# Patient Record
Sex: MALE | Race: WHITE
[De-identification: names, ages, dates, MRNs, and addresses within clinical notes are randomized per-mention and may not be internally consistent; named-entity substitution may affect disease eponyms.]

---

## 2017-10-20 ENCOUNTER — HOSPITAL ENCOUNTER (OUTPATIENT)
Dept: HOSPITAL 33 - SDC | Age: 52
Discharge: HOME | End: 2017-10-20
Attending: FAMILY MEDICINE
Payer: COMMERCIAL

## 2017-10-20 VITALS — SYSTOLIC BLOOD PRESSURE: 140 MMHG | DIASTOLIC BLOOD PRESSURE: 98 MMHG | HEART RATE: 78 BPM

## 2017-10-20 VITALS — OXYGEN SATURATION: 98 %

## 2017-10-20 DIAGNOSIS — E11.9: ICD-10-CM

## 2017-10-20 DIAGNOSIS — I10: ICD-10-CM

## 2017-10-20 DIAGNOSIS — Z12.11: Primary | ICD-10-CM

## 2017-10-20 PROCEDURE — 0DJD8ZZ INSPECTION OF LOWER INTESTINAL TRACT, VIA NATURAL OR ARTIFICIAL OPENING ENDOSCOPIC: ICD-10-PCS | Performed by: FAMILY MEDICINE

## 2017-10-20 PROCEDURE — 00810: CPT

## 2017-10-20 PROCEDURE — 82962 GLUCOSE BLOOD TEST: CPT

## 2017-10-20 NOTE — OP
SURGERY DATE/TIME:     10/20/2017  0823



PREOPERATIVE DIAGNOSIS:    Screening exam. 



POSTOPERATIVE DIAGNOSIS:    Normal colon. 



PROCEDURE:     Colonoscopy.



SURGEON:    Dr. Solis.



ANESTHESIA:    Medications were given by the anesthesia department. 

 

HISTORY:  The patient is a52 year old white male patient now presenting for screening 
colonoscopy.  The patient was appraised of the risks of the procedure including the risk 
of perforation, phlebitis, untoward reaction to medication, bleeding or missed lesions. 
The patient verbalized his understanding and desired to have the procedure performed.



DESCRIPTION OF PROCEDURE:    The patient was given the medications by the anesthesia 
department. He had continuous pulse oximetry, ECG monitoring, intermittent blood pressure 
monitoring and tidal CO2 monitoring during the examination. He was placed in left lateral 
decubitus position. A digital rectal examination was performed and revealed normal anal 
sphincter tone, no masses and normal prostate. The flexible Olympus pediatric colonoscope 
was used to intubate the rectum.  A view of the colon was developed sequentially to the 
cecum. Upon insertion and withdrawal, including a retroflex view in the rectum, no mucosal 
lesions were encountered.  The scope was removed from the patient who tolerated the 
procedure well and was sent back to OP recovery in good condition. The prep was noted to 
be fair to at times poor but we were able to adequately evaluate the colon for polyps.

## 2023-03-24 ENCOUNTER — HOSPITAL ENCOUNTER (OUTPATIENT)
Dept: HOSPITAL 33 - SDC | Age: 58
Discharge: HOME | End: 2023-03-24
Attending: FAMILY MEDICINE
Payer: COMMERCIAL

## 2023-03-24 VITALS — OXYGEN SATURATION: 95 % | SYSTOLIC BLOOD PRESSURE: 138 MMHG | HEART RATE: 65 BPM | DIASTOLIC BLOOD PRESSURE: 85 MMHG

## 2023-03-24 DIAGNOSIS — Z09: Primary | ICD-10-CM

## 2023-03-24 DIAGNOSIS — Z86.010: ICD-10-CM

## 2023-03-24 DIAGNOSIS — E11.9: ICD-10-CM

## 2023-03-24 PROCEDURE — 82947 ASSAY GLUCOSE BLOOD QUANT: CPT

## 2023-03-24 NOTE — OP
SURGERY DATE:    03/24/2023



SURGERY TIME:      0755



PREOPERATIVE DIAGNOSIS:    

1.  SCREENING EXAM.

2.  HISTORY OF COLON POLYPS.



POSTOPERATIVE DIAGNOSIS:    

1.  NORMAL EXAM.



PROCEDURE:    

1.  Colonoscopy.



SURGEON:    Dr. Solis.



ANESTHESIA:    MAC. Medications given by the Anesthesia Department.



BRIEF HISTORY:    The patient is a 58 y/o WM patient presenting for colonoscopic 
evaluation. The patient reports previous history of 3 previous colonoscopies, 2 of which 
had polyps removed. The patient was felt to need to have endoscopic evaluation. He was 
described the risks of the procedure including the risk of perforation, phlebitis, 
untoward reaction to medication, bleeding, and missed lesions.  The patient verbalized his 
understanding and desired to have the procedure performed.



DESCRIPTION OF PROCEDURE:    The patient was given the medications by the Anesthesia 
Department. He had continuous pulse oximetry, ECG monitoring, and intermittent BP 
monitoring during the examination. He was placed in the left lateral decubitus position. A 
digital rectal examination was performed and revealed normal anal sphincter tone and no 
masses.  The flexible Olympus pediatric colonoscope was used to intubate the rectum.  A 
view of the colon was developed sequentially to the cecum. Upon insertion and withdrawal, 
including a retroflex view in the rectum, no mucosal lesions were encountered. The scope 
was removed from the patient who tolerated the procedure well and was sent back to OP 
recovery in good condition. The prep was noted to be fair.

## 2025-02-06 ENCOUNTER — HOSPITAL ENCOUNTER (EMERGENCY)
Dept: HOSPITAL 33 - ED | Age: 60
Discharge: HOME | End: 2025-02-06
Payer: COMMERCIAL

## 2025-02-06 VITALS — HEART RATE: 98 BPM | SYSTOLIC BLOOD PRESSURE: 138 MMHG | RESPIRATION RATE: 16 BRPM | DIASTOLIC BLOOD PRESSURE: 99 MMHG

## 2025-02-06 VITALS — OXYGEN SATURATION: 96 %

## 2025-02-06 VITALS — TEMPERATURE: 97.4 F

## 2025-02-06 DIAGNOSIS — J10.1: Primary | ICD-10-CM

## 2025-02-06 DIAGNOSIS — M79.10: ICD-10-CM

## 2025-02-06 DIAGNOSIS — Z79.899: ICD-10-CM

## 2025-02-06 DIAGNOSIS — E78.5: ICD-10-CM

## 2025-02-06 DIAGNOSIS — I10: ICD-10-CM

## 2025-02-06 DIAGNOSIS — R05.1: ICD-10-CM

## 2025-02-06 DIAGNOSIS — E11.9: ICD-10-CM

## 2025-02-06 DIAGNOSIS — R11.2: ICD-10-CM

## 2025-02-06 LAB
ALBUMIN SERPL-MCNC: 4.6 G/DL (ref 3.5–5)
ALP SERPL-CCNC: 65 U/L (ref 38–126)
ALT SERPL-CCNC: 61 U/L (ref 0–50)
ANION GAP SERPL CALC-SCNC: 16.8 MEQ/L (ref 5–15)
AST SERPL QL: 74 U/L (ref 17–59)
BASOPHILS # BLD AUTO: 0.05 X10^3/UL (ref 0.01–0.08)
BASOPHILS NFR BLD AUTO: 0.6 % (ref 0.2–1.2)
BILIRUB BLD-MCNC: 2.1 MG/DL (ref 0.2–1.3)
BUN SERPL-MCNC: 14 MG/DL (ref 9–20)
CALCIUM SPEC-MCNC: 8.9 MG/DL (ref 8.4–10.2)
CHLORIDE SERPL-SCNC: 94 MMOL/L (ref 98–107)
CO2 SERPL-SCNC: 26 MMOL/L (ref 22–30)
CREAT SERPL-MCNC: 1.09 MG/DL (ref 0.66–1.25)
EOSINOPHIL # BLD AUTO: 0.01 X10^3/UL (ref 0.04–0.54)
FLUAV AG NPH QL IA: POSITIVE
FLUBV AG NPH QL IA: NEGATIVE
GFR SERPLBLD BASED ON 1.73 SQ M-ARVRAT: 78.7 ML/MIN
GLUCOSE SERPL-MCNC: 248 MG/DL (ref 74–106)
HCT VFR BLD AUTO: 51.2 % (ref 40.1–51)
HGB BLD-MCNC: 17.6 G/DL (ref 13.7–17.5)
IMM GRANULOCYTES # BLD: 0.19 X10^3U/L (ref 0–0.03)
IMM GRANULOCYTES NFR BLD: 2.2 % (ref 0–0.43)
LYMPHOCYTES # SPEC AUTO: 0.59 X10^3/UL (ref 1.32–3.57)
MAGNESIUM SERPL-MCNC: 1.9 MG/DL (ref 1.6–2.3)
MCH RBC QN AUTO: 30.8 PG (ref 25.7–32.2)
MCHC RBC AUTO-ENTMCNC: 34.4 G/DL (ref 32.3–36.5)
MONOCYTES # BLD AUTO: 0.87 X10^3/UL (ref 0.3–0.82)
NRBC # BLD AUTO: 0 X10^3U/L (ref 0–0.01)
NRBC BLD AUTO-RTO: 0 % (ref 0–0.2)
PLATELET # BLD AUTO: 144 X10^3/UL (ref 163–337)
POTASSIUM SERPLBLD-SCNC: 4.6 MMOL/L (ref 3.5–5.1)
PROT SERPL-MCNC: 7.7 G/DL (ref 6.3–8.2)
RBC # BLD AUTO: 5.72 X10^6/UL (ref 4.63–6.08)
RSV AG SPEC QL IA: NEGATIVE
SARS-COV-2 AG RESP QL IA.RAPID: NEGATIVE
SODIUM SERPL-SCNC: 132 MMOL/L (ref 135–145)
WBC # BLD AUTO: 8.5 X10^3/UL (ref 4.23–9.07)

## 2025-02-06 PROCEDURE — 80053 COMPREHEN METABOLIC PANEL: CPT

## 2025-02-06 PROCEDURE — 85025 COMPLETE CBC W/AUTO DIFF WBC: CPT

## 2025-02-06 PROCEDURE — 83735 ASSAY OF MAGNESIUM: CPT

## 2025-02-06 PROCEDURE — 0241U: CPT

## 2025-02-06 PROCEDURE — 96375 TX/PRO/DX INJ NEW DRUG ADDON: CPT

## 2025-02-06 PROCEDURE — 83605 ASSAY OF LACTIC ACID: CPT

## 2025-02-06 PROCEDURE — 96374 THER/PROPH/DIAG INJ IV PUSH: CPT

## 2025-02-06 PROCEDURE — 36415 COLL VENOUS BLD VENIPUNCTURE: CPT

## 2025-02-06 PROCEDURE — 93005 ELECTROCARDIOGRAM TRACING: CPT

## 2025-02-06 PROCEDURE — 99284 EMERGENCY DEPT VISIT MOD MDM: CPT

## 2025-02-06 PROCEDURE — 71045 X-RAY EXAM CHEST 1 VIEW: CPT

## 2025-02-06 PROCEDURE — 94640 AIRWAY INHALATION TREATMENT: CPT

## 2025-02-06 PROCEDURE — 84484 ASSAY OF TROPONIN QUANT: CPT

## 2025-02-06 RX ADMIN — OSELTAMIVIR PHOSPHATE ONE MG: 75 CAPSULE ORAL at 18:58

## 2025-02-06 RX ADMIN — IPRATROPIUM BROMIDE AND ALBUTEROL SULFATE ONE ML: .5; 3 SOLUTION RESPIRATORY (INHALATION) at 17:47

## 2025-02-06 RX ADMIN — KETOROLAC TROMETHAMINE ONE MG: 30 INJECTION, SOLUTION INTRAMUSCULAR; INTRAVENOUS at 17:34

## 2025-02-06 RX ADMIN — WATER ONE MG: 1 INJECTION INTRAMUSCULAR; INTRAVENOUS; SUBCUTANEOUS at 18:35

## 2025-02-06 NOTE — ERPHSYRPT
- History of Present Illness


Time Seen by Provider: 02/06/25 16:41


Source: patient, family


Exam Limitations: no limitations


Patient Subjective Stated Complaint: PT HERE FOR SOB FOR A COUPLE DAYS NOW, 

COUGH, ACHES.


Triage Nursing Assessment: PT ALERT, WALKED IN, RESP EASY, OCC COUGH, CHEST 

CLEAR, ABD SOFT, NO EDEMA NOTED,


Physician History: 





59 years old male presented in the ER with complains of aches and pains all ove

r, sinus/nasal congestion, cough productive of yellow-green sputum for the last 

2 to 3 days with progressive worsening.  Patient is having coughing spells with 

feeling shortness of breath.  Because of repeated coughing having generalized 

chest soreness and low back pain.  Has nausea and dry heaving with no vomiting. 

Wife had similar symptoms.  Does have loose stool as well.  Subjective feeling 

of fever and chills.


Allergies/Adverse Reactions: 








No Known Drug Allergies Allergy (Verified 02/06/25 16:41)


   





Hx Tetanus, Diphtheria Vaccination/Date Given: Yes (UNKNOWN)


Hx Influenza Vaccination/Date Given: No


Hx Pneumococcal Vaccination/Date Given: No


Immunizations Up to Date: Yes





Travel Risk





- International Travel


Have you traveled outside of the country in past 3 weeks: No





- Emerging Infectious Disease


Are you exhibiting symptoms associated with any current EIDs: Yes


Symptoms: Cough: New Onset, Shortness of Breath





- Review of Systems


Constitutional: Fever, Chills, Fatigue, Weakness


Eyes: No Symptoms


Ears, Nose, & Throat: Nose Congestion, Throat Swelling


Respiratory: Cough


Cardiac: No Symptoms


Abdominal/Gastrointestinal: Nausea, Diarrhea


Genitourinary Symptoms: No Symptoms


Musculoskeletal: Back Pain, Myalgias


Neurological: Headache


Endocrine: No Symptoms


Hematologic/Lymphatic: No Symptoms


Immunological/Allergic: No Symptoms





- Past Medical History


Pertinent Past Medical History: Yes


Neurological History: TIA


ENT History: No Pertinent History


Cardiac History: Aneurysm, High Cholesterol, Hypertension


Respiratory History: No Pertinent History


Endocrine Medical History: Diabetes Type II


Musculoskeletal History: No Pertinent History


GI Medical History: No Pertinent History


 History: No Pertinent History


Psycho-Social History: No Pertinent History


Male Reproductive Disorders: No Pertinent History


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





- Past Surgical History


Past Surgical History: No


Neuro Surgical History: No Pertinent History


Cardiac: No Pertinent History


Respiratory: No Pertinent History


Gastrointestinal: No Pertinent History


Genitourinary: No Pertinent History


Musculoskeletal: No Pertinent History


Male Surgical History: No Pertinent History





- Social History


Smoking Status: Never smoker


Exposure to second hand smoke: No


Drug Use: none


Patient Lives Alone: No





- Social Determinants of Health


Will the patient participate in the screening: Declined to provide





- Nursing Vital Signs


Nursing Vital Signs: 


                               Initial Vital Signs











Temperature  97.4 F   02/06/25 16:49


 


Pulse Rate  87   02/06/25 16:49


 


Respiratory Rate  18   02/06/25 16:49


 


Blood Pressure  190/128   02/06/25 16:49


 


O2 Sat by Pulse Oximetry  96   02/06/25 16:49








                                   Pain Scale











Pain Intensity                 4

















- Physical Exam


General Appearance: no apparent distress, alert


Eye Exam: PERRL/EOMI


Ears, Nose, Throat Exam: moist mucous membranes, pharyngeal erythema


Neck Exam: normal inspection, non-tender, supple, full range of motion


Respiratory Exam: normal breath sounds, lungs clear


Cardiovascular Exam: regular rate/rhythm, normal heart sounds


Gastrointestinal/Abdomen Exam: soft, normal bowel sounds, No tenderness, No 

rebound


Back Exam: normal inspection, normal range of motion


Extremity Exam: normal inspection, normal range of motion


Neurologic Exam: alert, oriented x 3, cooperative, CNs II-XII nml as tested


Skin Exam: normal color


**SpO2 Interpretation**: normal


SpO2: 96


O2 Delivery: Room Air





- Course


EKG Interpreted by Me: RATE ([id number]), Sinus Rhythm, Left Axis Deviation, NORMAL 

INTERVALS, NORMAL QRS


Ordered Tests: 


                               Active Orders 24 hr











 Category Date Time Status


 


 CHEST 1 VIEW (PORTABLE) Stat Exams  02/06/25 16:57 Taken


 


 CBC W DIFF Stat Lab  02/06/25 17:39 Completed


 


 CMP Stat Lab  02/06/25 18:00 Completed


 


 Lactic Acid Stat Lab  02/06/25 17:45 Completed


 


 MAGNESIUM Stat Lab  02/06/25 18:00 Completed


 


 TROPONIN Q4H Lab  02/06/25 18:00 Completed


 


 Respiratory Therapy Assessment DAILY RT  02/06/25 17:50 Completed








Medication Summary














Discontinued Medications














Generic Name Dose Route Start Last Admin





  Trade Name Freq  PRN Reason Stop Dose Admin


 


Albuterol/Ipratropium  3 ml  02/06/25 17:28  02/06/25 17:47





  Ipratropium/Albuterol Sulfate 3 Ml Ampul.Neb  IH  02/06/25 17:29  3 ml





  STAT ONE   Administration


 


Albuterol/Ipratropium  Confirm  02/06/25 17:44 





  Ipratropium/Albuterol Sulfate 3 Ml Ampul.Neb  Administered  02/06/25 17:45 





  Dose  





  3 ml  





  IH  





  .STK-MED ONE  


 


Methylprednisolone Sodium  0 mg  02/06/25 18:24  02/06/25 18:35





Succinate 125 mg/ Sterile  IV  02/06/25 18:25  125 mg





Water 2 ml  STAT ONE   Administration


 


Sodium Chloride  1,000 mls @ 999 mls/hr  02/06/25 18:23  02/06/25 18:35





  Sodium Chloride 0.9% 1000 Ml  IV  02/06/25 19:23  999 mls/hr





  .Q1H1M STA   Administration


 


Sodium Chloride  Confirm  02/06/25 18:31 





  Sodium Chloride 0.9% 1000 Ml  Administered  02/06/25 18:32 





  Dose  





  1,000 mls @ ud  





  .ROUTE  





  .STK-MED ONE  


 


Ketorolac Tromethamine  30 mg  02/06/25 17:28  02/06/25 17:34





  Ketorolac Tromethamine 30 Mg/Ml Inj  IV  02/06/25 17:29  30 mg





  STAT ONE   Administration


 


Ketorolac Tromethamine  Confirm  02/06/25 17:31 





  Ketorolac Tromethamine 30 Mg/Ml Inj  Administered  02/06/25 17:32 





  Dose  





  30 mg  





  .ROUTE  





  .STK-MED ONE  


 


Methylprednisolone Sodium Succinate  Confirm  02/06/25 18:31 





  Methylprednis Sod Succ 125 Mg/2 Ml Vial***  Administered  02/06/25 18:32 





  Dose  





  125 mg  





  .ROUTE  





  .STK-MED ONE  


 


Oseltamivir Phosphate  75 mg  02/06/25 18:53  02/06/25 18:58





  Oseltamivir 75 Mg Cap  PO  02/06/25 18:54  75 mg





  STAT ONE   Administration


 


Oseltamivir Phosphate  Confirm  02/06/25 18:56 





  Oseltamivir 75 Mg Cap  Administered  02/06/25 18:57 





  Dose  





  75 mg  





  PO  





  .STK-MED ONE  


 


Sterile Water  Confirm  02/06/25 18:31 





  Water For Injection,Sterile 10 Ml Vial  Administered  02/06/25 18:32 





  Dose  





  10 ml  





  IJ  





  .STK-MED ONE  











Lab/Rad Data: 


                           Laboratory Result Diagrams





                                 02/06/25 17:39 





                                 02/06/25 18:00 





                               Laboratory Results











  02/06/25 02/06/25 02/06/25 Range/Units





  18:00 18:00 17:56 


 


WBC     (4.23-9.07)  x10^3/uL


 


RBC     (4.63-6.08)  x10^6/uL


 


Hgb     (13.7-17.5)  g/dL


 


Hct     (40.1-51.0)  %


 


MCV     (79.0-92.2)  fL


 


MCH     (25.7-32.2)  pg


 


MCHC     (32.3-36.5)  g/dL


 


RDW     (11.6-14.4)  %


 


Plt Count     (163-337)  x10^3/uL


 


MPV     (9.4-12.4)  fL


 


Gran %     (34.0-67.9)  %


 


Immature Gran % (Auto)     (0.001-0.429)  %


 


Nucleat RBC Rel Count     (0.00-0.2)  %


 


Eos # (Auto)     (0.04-0.54)  x10^3/uL


 


Immature Gran # (Auto)     (0.001-0.031)  x10^3u/L


 


Absolute Lymphs (auto)     (1.32-3.57)  x10^3/uL


 


Absolute Monos (auto)     (0.30-0.82)  x10^3/uL


 


Absolute Nucleated RBC     (0.00-0.012)  x10^3u/L


 


Lymphocytes %     (21.8-53.1)  %


 


Monocytes %     (5.3-12.2)  %


 


Eosinophils %     (0.8-7.0)  %


 


Basophils %     (0.2-1.2)  %


 


Absolute Granulocytes     (1.78-5.38)  x10^3/uL


 


Basophils #     (0.01-0.08)  x10^3/uL


 


Sodium   132 L   (135-145)  mmol/L


 


Potassium   4.6   (3.5-5.1)  mmol/L


 


Chloride   94 L   ()  mmol/L


 


Carbon Dioxide   26   (22-30)  mmol/L


 


Anion Gap   16.8 H   (5-15)  MEQ/L


 


BUN   14   (9-20)  mg/dL


 


Creatinine   1.09   (0.66-1.25)  mg/dL


 


Estimated GFR   78.7   ML/MIN


 


Glucose   248 H   ()  mg/dL


 


Lactic Acid     (0.4-2.0)  


 


Calcium   8.9   (8.4-10.2)  mg/dL


 


Magnesium   1.9   (1.6-2.3)  mg/dL


 


Total Bilirubin   2.10 H   (0.2-1.3)  mg/dL


 


AST   74 H   (17-59)  U/L


 


ALT   61 H   (0-50)  U/L


 


Alkaline Phosphatase   65   ()  U/L


 


Troponin I  < 0.012    (0.000-0.033)  ng/mL


 


Serum Total Protein   7.7   (6.3-8.2)  g/dL


 


Albumin   4.6   (3.5-5.0)  g/dL


 


Influenza Type A Ag    POSITIVE A  (NEGATIVE)  


 


Influenza Type B Ag    NEGATIVE  (NEGATIVE)  


 


RSV (PCR)    NEGATIVE  (NEGATIVE)  


 


SARS-CoV-2 (PCR)    NEGATIVE  (NEGATIVE)  














  02/06/25 02/06/25 Range/Units





  17:45 17:39 


 


WBC   8.5  (4.23-9.07)  x10^3/uL


 


RBC   5.72  (4.63-6.08)  x10^6/uL


 


Hgb   17.6 H  (13.7-17.5)  g/dL


 


Hct   51.2 H  (40.1-51.0)  %


 


MCV   89.5  (79.0-92.2)  fL


 


MCH   30.8  (25.7-32.2)  pg


 


MCHC   34.4  (32.3-36.5)  g/dL


 


RDW   12.9  (11.6-14.4)  %


 


Plt Count   144 L  (163-337)  x10^3/uL


 


MPV   10.6  (9.4-12.4)  fL


 


Gran %   79.8 H  (34.0-67.9)  %


 


Immature Gran % (Auto)   2.2 H  (0.001-0.429)  %


 


Nucleat RBC Rel Count   0.0  (0.00-0.2)  %


 


Eos # (Auto)   0.01 L  (0.04-0.54)  x10^3/uL


 


Immature Gran # (Auto)   0.19 H  (0.001-0.031)  x10^3u/L


 


Absolute Lymphs (auto)   0.59 L  (1.32-3.57)  x10^3/uL


 


Absolute Monos (auto)   0.87 H  (0.30-0.82)  x10^3/uL


 


Absolute Nucleated RBC   0.00  (0.00-0.012)  x10^3u/L


 


Lymphocytes %   7.0 L  (21.8-53.1)  %


 


Monocytes %   10.3  (5.3-12.2)  %


 


Eosinophils %   0.1 L  (0.8-7.0)  %


 


Basophils %   0.6  (0.2-1.2)  %


 


Absolute Granulocytes   6.75 H  (1.78-5.38)  x10^3/uL


 


Basophils #   0.05  (0.01-0.08)  x10^3/uL


 


Sodium    (135-145)  mmol/L


 


Potassium    (3.5-5.1)  mmol/L


 


Chloride    ()  mmol/L


 


Carbon Dioxide    (22-30)  mmol/L


 


Anion Gap    (5-15)  MEQ/L


 


BUN    (9-20)  mg/dL


 


Creatinine    (0.66-1.25)  mg/dL


 


Estimated GFR    ML/MIN


 


Glucose    ()  mg/dL


 


Lactic Acid  2.2 H   (0.4-2.0)  


 


Calcium    (8.4-10.2)  mg/dL


 


Magnesium    (1.6-2.3)  mg/dL


 


Total Bilirubin    (0.2-1.3)  mg/dL


 


AST    (17-59)  U/L


 


ALT    (0-50)  U/L


 


Alkaline Phosphatase    ()  U/L


 


Troponin I    (0.000-0.033)  ng/mL


 


Serum Total Protein    (6.3-8.2)  g/dL


 


Albumin    (3.5-5.0)  g/dL


 


Influenza Type A Ag    (NEGATIVE)  


 


Influenza Type B Ag    (NEGATIVE)  


 


RSV (PCR)    (NEGATIVE)  


 


SARS-CoV-2 (PCR)    (NEGATIVE)  














- Progress


Progress: improved, re-examined


Air Movement: good


Progress Note: 





02/06/25 19:35


59 years old is evaluated in the ER for flulike symptoms.  


EKG is normal sinus rhythm with no ST elevation.


He is given Toradol for symptomatic relief.  Chest x-ray no obvious infiltrate 

reviewed by me, questionable opacity in the left lower lobe, official report is 

pending.


Normal white count, chemistries with a glucose of 246, elevated transaminases 

and total bili of 2.  1 and a lactate of 2.2 and a gap of 16.


He is given fluids and feeling much better on reevaluation.


Patient is positive influenza A and started on Tamiflu along with a dose of 

Solu-Medrol


Has negative troponins.


I believe patient's symptoms are viral etiology from influenza A, do not think 

it is cardiac etiology and do not think it needs any other workup and is stable 

for discharge.  Discussed signs symptoms of worsening needing return to ER which

he seems understanding..








Blood Culture(s) Obtained: No


Antibiotics given: No


Counseled pt/family regarding: lab results, diagnosis, need for follow-up, rad 

results





Medical Desision Making





- Independent Historian


Additional History obtained from: Spouse





- Diagnostic Testing


Diagnostic test were ordered, analyzed, and reviewed by me: Yes


Radiological Interpretation: Interpreted by me, Reviewed by me





- Risk of complications


The pt has a mod risk of morbidity or mortality based on: Need for prescription 

drug management





- Departure


Departure Disposition: Home


Clinical Impression: 


 Influenza A, Viral syndrome





Condition: Stable


Critical Care Time: No


Referrals: 


NEGAR HANLEY [Primary Care Provider] - Follow up with PCP 1 day


Instructions:  Flu in adults - ED discharge instructions


Additional Instructions: 


Drink plenty of fluids.  Take Tylenol/ibuprofen as needed.  Follow-up with 

primary care for reevaluation.  Return to ER for any worsening.


Prescriptions: 


Albuterol Sulfate [Albuterol Sulfate Hfa] 8.5 gm IH Q6H PRN 7 Days #1 inh


 PRN Reason: Cough


Oseltamivir 75 mg*** [Tamiflu 75MG Capsule***] 75 mg PO BID #10 cap

## 2025-02-07 NOTE — XRAY
Indication: Short of breath.



Comparison: October 9, 2015



Portable apical lordotic chest again demonstrates minimal left base

subsegmental atelectasis/scarring.  Remaining heart and lungs unremarkable.

Bony thorax intact again with mild degenerative changes.  No acute findings.

## 2025-05-06 ENCOUNTER — HOSPITAL ENCOUNTER (OUTPATIENT)
Dept: HOSPITAL 33 - SDC | Age: 60
Discharge: HOME | End: 2025-05-06
Attending: STUDENT IN AN ORGANIZED HEALTH CARE EDUCATION/TRAINING PROGRAM
Payer: COMMERCIAL

## 2025-05-06 VITALS — RESPIRATION RATE: 18 BRPM

## 2025-05-06 VITALS — OXYGEN SATURATION: 97 % | HEART RATE: 73 BPM | TEMPERATURE: 97.4 F

## 2025-05-06 VITALS — DIASTOLIC BLOOD PRESSURE: 77 MMHG | SYSTOLIC BLOOD PRESSURE: 143 MMHG

## 2025-05-06 DIAGNOSIS — M86.9: Primary | ICD-10-CM

## 2025-05-06 DIAGNOSIS — E11.42: ICD-10-CM

## 2025-05-06 LAB
ALBUMIN SERPL-MCNC: 4.3 G/DL (ref 3.5–5)
ANION GAP SERPL CALC-SCNC: 16.3 MEQ/L (ref 5–15)
BASOPHILS # BLD AUTO: 0.08 X10^3/UL (ref 0.01–0.08)
BILIRUB BLD-MCNC: 1.3 MG/DL (ref 0.2–1.3)
CALCIUM SPEC-MCNC: 9.5 MG/DL (ref 8.4–10.2)
CREAT SERPL-MCNC: 0.93 MG/DL (ref 0.66–1.25)
EOSINOPHIL # BLD AUTO: 0.22 X10^3/UL (ref 0.04–0.54)
GFR SERPLBLD BASED ON 1.73 SQ M-ARVRAT: 95.2 ML/MIN
HCT VFR BLD AUTO: 43.8 % (ref 40.1–51)
HGB BLD-MCNC: 15.2 G/DL (ref 13.7–17.5)
IMM GRANULOCYTES # BLD: 0.11 X10^3U/L (ref 0–0.03)
IMM GRANULOCYTES NFR BLD: 1.4 % (ref 0–0.43)
LYMPHOCYTES # SPEC AUTO: 1.64 X10^3/UL (ref 1.32–3.57)
MCH RBC QN AUTO: 30.7 PG (ref 25.7–32.2)
MCHC RBC AUTO-ENTMCNC: 34.7 G/DL (ref 32.3–36.5)
MONOCYTES # BLD AUTO: 0.76 X10^3/UL (ref 0.3–0.82)
PLATELET # BLD AUTO: 228 X10^3/UL (ref 163–337)
POTASSIUM SERPLBLD-SCNC: 4.1 MMOL/L (ref 3.5–5.1)
PROT SERPL-MCNC: 7.7 G/DL (ref 6.3–8.2)
RBC # BLD AUTO: 4.95 X10^6/UL (ref 4.63–6.08)
WBC # BLD AUTO: 8.1 X10^3/UL (ref 4.23–9.07)

## 2025-05-06 PROCEDURE — 93005 ELECTROCARDIOGRAM TRACING: CPT

## 2025-05-06 PROCEDURE — 87075 CULTR BACTERIA EXCEPT BLOOD: CPT

## 2025-05-06 PROCEDURE — 28810 AMPUTATION TOE & METATARSAL: CPT

## 2025-05-06 PROCEDURE — 83036 HEMOGLOBIN GLYCOSYLATED A1C: CPT

## 2025-05-06 PROCEDURE — A6260 WOUND CLEANSER ANY TYPE/SIZE: HCPCS

## 2025-05-06 PROCEDURE — 85025 COMPLETE CBC W/AUTO DIFF WBC: CPT

## 2025-05-06 PROCEDURE — 36415 COLL VENOUS BLD VENIPUNCTURE: CPT

## 2025-05-06 PROCEDURE — 87070 CULTURE OTHR SPECIMN AEROBIC: CPT

## 2025-05-06 PROCEDURE — 80053 COMPREHEN METABOLIC PANEL: CPT

## 2025-05-06 RX ADMIN — CEFAZOLIN SCH MLS/HR: 2 INJECTION, POWDER, FOR SOLUTION INTRAMUSCULAR; INTRAVENOUS at 08:55

## 2025-05-13 ENCOUNTER — HOSPITAL ENCOUNTER (OUTPATIENT)
Dept: HOSPITAL 33 - SDC | Age: 60
Discharge: HOME | End: 2025-05-13
Attending: STUDENT IN AN ORGANIZED HEALTH CARE EDUCATION/TRAINING PROGRAM
Payer: COMMERCIAL

## 2025-05-13 VITALS — DIASTOLIC BLOOD PRESSURE: 97 MMHG | SYSTOLIC BLOOD PRESSURE: 163 MMHG | HEART RATE: 70 BPM | OXYGEN SATURATION: 97 %

## 2025-05-13 VITALS — RESPIRATION RATE: 16 BRPM

## 2025-05-13 VITALS — TEMPERATURE: 97.2 F

## 2025-05-13 DIAGNOSIS — E11.621: ICD-10-CM

## 2025-05-13 DIAGNOSIS — E11.42: ICD-10-CM

## 2025-05-13 DIAGNOSIS — M86.9: Primary | ICD-10-CM

## 2025-05-13 LAB
ANION GAP SERPL CALC-SCNC: 15.8 MEQ/L (ref 5–15)
BILIRUB BLD-MCNC: 1.3 MG/DL (ref 0.2–1.3)
CALCIUM SPEC-MCNC: 9.5 MG/DL (ref 8.4–10.2)
CREAT SERPL-MCNC: 0.95 MG/DL (ref 0.66–1.25)
GFR SERPLBLD BASED ON 1.73 SQ M-ARVRAT: 92.8 ML/MIN
HCT VFR BLD AUTO: 42.1 % (ref 40.1–51)
HGB BLD-MCNC: 14.4 G/DL (ref 13.7–17.5)
MCH RBC QN AUTO: 30.3 PG (ref 25.7–32.2)
MCHC RBC AUTO-ENTMCNC: 34.2 G/DL (ref 32.3–36.5)
PLATELET # BLD AUTO: 231 X10^3/UL (ref 163–337)
POTASSIUM SERPLBLD-SCNC: 4.4 MMOL/L (ref 3.5–5.1)
PROT SERPL-MCNC: 7.6 G/DL (ref 6.3–8.2)
RBC # BLD AUTO: 4.75 X10^6/UL (ref 4.63–6.08)
WBC # BLD AUTO: 7.3 X10^3/UL (ref 4.23–9.07)

## 2025-05-13 PROCEDURE — 87206 SMEAR FLUORESCENT/ACID STAI: CPT

## 2025-05-13 PROCEDURE — 80053 COMPREHEN METABOLIC PANEL: CPT

## 2025-05-13 PROCEDURE — 87046 STOOL CULTR AEROBIC BACT EA: CPT

## 2025-05-13 PROCEDURE — 28005 TREAT FOOT BONE LESION: CPT

## 2025-05-13 PROCEDURE — 13160 SEC CLSR SURG WND/DEHSN XTN: CPT

## 2025-05-13 PROCEDURE — 87070 CULTURE OTHR SPECIMN AEROBIC: CPT

## 2025-05-13 PROCEDURE — 85027 COMPLETE CBC AUTOMATED: CPT

## 2025-05-13 PROCEDURE — 87116 MYCOBACTERIA CULTURE: CPT

## 2025-05-13 PROCEDURE — 82947 ASSAY GLUCOSE BLOOD QUANT: CPT

## 2025-05-13 PROCEDURE — 87075 CULTR BACTERIA EXCEPT BLOOD: CPT

## 2025-05-13 PROCEDURE — 36415 COLL VENOUS BLD VENIPUNCTURE: CPT

## 2025-05-13 PROCEDURE — 87205 SMEAR GRAM STAIN: CPT

## 2025-05-13 PROCEDURE — 93005 ELECTROCARDIOGRAM TRACING: CPT

## 2025-05-13 RX ADMIN — CEFAZOLIN SCH MLS/HR: 2 INJECTION, POWDER, FOR SOLUTION INTRAMUSCULAR; INTRAVENOUS at 09:27
